# Patient Record
Sex: FEMALE | Race: WHITE | Employment: UNEMPLOYED | URBAN - METROPOLITAN AREA
[De-identification: names, ages, dates, MRNs, and addresses within clinical notes are randomized per-mention and may not be internally consistent; named-entity substitution may affect disease eponyms.]

---
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
## 2025-02-14 NOTE — PROGRESS NOTES
Chief Complaint   Patient presents with    Cough     x2-3 weeks    Other     chest congestion Speaking Coherently